# Patient Record
Sex: MALE | Race: WHITE | NOT HISPANIC OR LATINO | URBAN - METROPOLITAN AREA
[De-identification: names, ages, dates, MRNs, and addresses within clinical notes are randomized per-mention and may not be internally consistent; named-entity substitution may affect disease eponyms.]

---

## 2018-10-12 ENCOUNTER — EMERGENCY (EMERGENCY)
Facility: HOSPITAL | Age: 43
LOS: 1 days | Discharge: HOME | End: 2018-10-12
Attending: EMERGENCY MEDICINE | Admitting: EMERGENCY MEDICINE
Payer: COMMERCIAL

## 2018-10-12 VITALS
SYSTOLIC BLOOD PRESSURE: 153 MMHG | OXYGEN SATURATION: 100 % | DIASTOLIC BLOOD PRESSURE: 95 MMHG | HEART RATE: 97 BPM | RESPIRATION RATE: 16 BRPM

## 2018-10-12 VITALS
DIASTOLIC BLOOD PRESSURE: 87 MMHG | RESPIRATION RATE: 18 BRPM | OXYGEN SATURATION: 100 % | TEMPERATURE: 98 F | SYSTOLIC BLOOD PRESSURE: 147 MMHG | HEART RATE: 73 BPM

## 2018-10-12 DIAGNOSIS — R41.82 ALTERED MENTAL STATUS, UNSPECIFIED: ICD-10-CM

## 2018-10-12 DIAGNOSIS — F10.129 ALCOHOL ABUSE WITH INTOXICATION, UNSPECIFIED: ICD-10-CM

## 2018-10-12 PROCEDURE — 99284 EMERGENCY DEPT VISIT MOD MDM: CPT | Mod: 25

## 2018-10-12 PROCEDURE — 70450 CT HEAD/BRAIN W/O DYE: CPT | Mod: 26

## 2018-10-12 NOTE — ED PROVIDER NOTE - NSFOLLOWUPCLINICS_GEN_ALL_ED_FT
[facility]  Family Medicine  [facility]. [address]  [place], [place] [facility]  Phone: [phone]  Fax:   Follow Up Time:

## 2018-10-12 NOTE — ED ADULT NURSE NOTE - NSIMPLEMENTINTERV_GEN_ALL_ED
Implemented All Fall Risk Interventions:  [place] to call system. Call bell, personal items and telephone within reach. Instruct patient to call for assistance. Room bathroom lighting operational. Non-slip footwear when patient is off stretcher. Physically safe environment: no spills, clutter or unnecessary equipment. Stretcher in lowest position, wheels locked, appropriate side rails in place. Provide visual cue, wrist band, yellow gown, etc. Monitor gait and stability. Monitor for mental status changes and reorient to person, place, and time. Review medications for side effects contributing to fall risk. Reinforce activity limits and safety measures with patient and family.

## 2018-10-12 NOTE — ED ADULT TRIAGE NOTE - CHIEF COMPLAINT QUOTE
Patient arrives via EMS for altered mental status, was [place] [address]; as per EMS; patient was able to get up and ambulate to bus; upon arrival, patient responsive to pain, unable to follow commands; small hematoma noted left side forehead; no vomiting

## 2018-10-12 NOTE — ED PROVIDER NOTE - PROGRESS NOTE DETAILS
The patient is now awake and alert, clinically sober.  Able to walk a straight line.  Repeat exam and neuro/cranial nerve exams normal.  No evidence of head/neck trauma.  Patient denies any pain or other complaints.  Denies cp/sob/ha/abd pain.  Abd soft, lungs clear, heart exam normal.  Anayeli po challenge.  Patient says only used alcohol no other substances.  Denies any assault.  Feels much better and pt feels safe for discharge.  No evidence of intoxication at this time or alcohol withdrawal.  No other complaints on discharge.

## 2018-10-12 NOTE — ED PROVIDER NOTE - NSFOLLOWUPINSTRUCTIONS_ED_ALL_ED_FT
Please follow-up in the [facility] by Monday for re-evaluation.     Please return to the ER immediately or call 911 for any high fever, trouble breathing, severe vomiting, worsening pain, or any other concerns.

## 2025-06-19 NOTE — ED ADULT NURSE NOTE - EXTENSIONS OF SELF_ADULT
Health Maintenance       Diabetes Foot Exam (Yearly)  Overdue since 4/12/2025    COVID-19 Vaccine (1)  Never done    Diabetes Eye Exam (Yearly)  Never done    Microalbumin Ratio (Yearly)  Never done    Shingles Vaccine (1 of 2)  Never done    Pneumococcal Vaccine 50+ (1 of 2 - PCV)  Never done           Following review of the above:  Patient is not proceeding with: Diabetes Eye Exam, COVID-19, Pneumococcal, and Shingles    Note: Refer to final orders and clinician documentation.          Preferred method of results communication:   Cell Phone:   Telephone Information:   Mobile [phone]     Okay to leave a message containing results? Yes             None